# Patient Record
Sex: FEMALE | Race: WHITE | HISPANIC OR LATINO | Employment: OTHER | URBAN - METROPOLITAN AREA
[De-identification: names, ages, dates, MRNs, and addresses within clinical notes are randomized per-mention and may not be internally consistent; named-entity substitution may affect disease eponyms.]

---

## 2020-07-09 ENCOUNTER — TRANSFERRED RECORDS (OUTPATIENT)
Dept: HEALTH INFORMATION MANAGEMENT | Facility: CLINIC | Age: 75
End: 2020-07-09

## 2020-07-09 LAB
CHOLEST SERPL-MCNC: 190 MG/DL
CREAT SERPL-MCNC: 0.62 MG/DL (ref 0.55–1.02)
GFR SERPL CREATININE-BSD FRML MDRD: >60 ML/MIN
GLUCOSE SERPL-MCNC: 91 MG/DL (ref 74–106)
HBA1C MFR BLD: 6.2 % (ref 0–5.7)
HDLC SERPL-MCNC: 49 MG/DL
LDLC SERPL CALC-MCNC: 108 MG/DL
POTASSIUM SERPL-SCNC: 4 MMOL/L (ref 3.5–5.1)
TRIGL SERPL-MCNC: 165 MG/DL

## 2020-08-17 ENCOUNTER — HOSPITAL ENCOUNTER (OUTPATIENT)
Dept: OCCUPATIONAL THERAPY | Facility: CLINIC | Age: 75
Setting detail: THERAPIES SERIES
End: 2020-08-17
Attending: FAMILY MEDICINE
Payer: COMMERCIAL

## 2020-08-17 PROCEDURE — 97140 MANUAL THERAPY 1/> REGIONS: CPT | Mod: GO | Performed by: OCCUPATIONAL THERAPIST

## 2020-08-17 PROCEDURE — 97165 OT EVAL LOW COMPLEX 30 MIN: CPT | Mod: GO | Performed by: OCCUPATIONAL THERAPIST

## 2020-08-17 NOTE — PROGRESS NOTES
08/17/20 1500   Quick Adds   Quick Adds Certification   Rehab Discipline   Discipline OT   Type of Visit   Type of visit Initial Edema Evaluation       present No   General Information   Start of care 08/17/20   Referring physician Robbin Monterroso MD   Orders Evaluate and treat as indicated   Order date 07/30/20   Medical diagnosis H/o HTN, OA, obesity.  Pt presents with BLE edema.   Onset of illness / date of surgery 08/01/15   Edema onset 08/01/15   Affected body parts LLE;RLE   Edema etiology comments contributing factors include:  sedentary lifestyle, abdominal pannus resting on inguinal lymphnodes   Pertinent history of current problem (PT: include personal factors and/or comorbidities that impact the POC; OT: include additional occupational profile info) Pt reported receiving CDT treatment in 2015, then being fit for custom stockings.  Pt has not received a new pair since 2015 and they have holes in them.     Surgical / medical history reviewed Yes   Prior level of functional mobility IND but slow   Prior treatment Complete decongestive therapy;Compression garments  (custom knee high stockings in 2015)   Community support   (pt lives alone, adult dtr with CP at her own place)   Patient role / employment history Employed   Living environment Apartment / condo   Living environment comments shoes can be tight at times   General observations wears shoes almost all the time   System Outcome Measures   Outcome Measures Lymphedema   Lymphedema Life Impact Scale (score range 0-72). A higher score indicates greater impairment. 30   Subjective Report   Patient report of symptoms leg swelling   Patient / Family Goals   Patient / family goals statement to reduce swelling in legs, get new stockings   Pain   Patient currently in pain No   Cognitive Status   Orientation Orientation to person, place and time   Level of consciousness Alert   Follows commands and answers questions 100% of the time    Edema Exam / Assessment   Skin condition comments BLes with skin intact, pink from ankles to midshins, and dry on anterior surfaces of lower shins, thick and long toenails.  BLEs with 1+ pitting of feet, 3+ pitting from ankles to midshins, 1+ pitting from midshins to knees, trace edema of knees and thighs.  Bilateral ankles are thicker due to edematous tissue.   Scar No   Ulceration No   Girth Measurements   Girth Measurements Refer to separate girth measurement flowsheet   Strength   Strength comments generalized weakness, winded with walk from car to clinic   Posture   Posture Normal   Activities of Daily Living   Activities of Daily Living IND   Bed Mobility   Bed mobility IND   Transfers   Transfers IND   Gait / Locomotion   Gait / Locomotion IND   Planned Edema Interventions   Planned edema interventions Manual lymph drainage;Gradient compression bandaging;Fit for compression garment;Exercises;Precautions to prevent infection / exacerbation;Education;Manual therapy;Skin care / precautions;Home management program development   Clinical Impression   Criteria for skilled therapeutic intervention met Yes   Therapy diagnosis BLE lymphedema   Influenced by the following impairments / conditions Stage 2   Assessment of Occupational Performance 1-3 Performance Deficits   Identified Performance Deficits at risk for progression if  not treated, at risk for infection,    Clinical Decision Making (Complexity) Low complexity   Treatment Frequency   (4x/wk x3wks, 1x/wk x3wks)   Treatment duration within 12 weeks due to scheduling conflicts   Patient / family and/or staff in agreement with plan of care Yes   Risks and benefits of therapy have been explained Yes   Clinical impression comments Pt presents with stage ll lymphedema of BLEs.  Pt has old compression stockings from 2015 but they have holes in them.  Pt reported wear of these old stockings until about 4 weeks ago and now leg edema has increased.  Pt will benefit  from continued skilled OT intervention to address BLE edema to reduce size for prevention of infection, to preserve skin integrity and to be fit for new compression garments for edema management at discharge.    Goals   Edema Eval Goals 1;2;3   Goal 1   Goal identifier home program   Goal description Pt will report compliance with home program at two consecutive treatment sessions including: GCB to BLes for 23 hours each day , self MLD and walking of at least 15 minutes each day to reduce edema to improve skin integrity, prevent infection and to be fit for compression garments for edema management at discharge.   Target date 11/09/20   Goal 2   Goal identifier volume   Goal description Pt will demonstrate at least a 500mL reduction in each RLE and LLE volume to prevent infection and to be fit for compression garments for edema management at discharge.   Target date 11/09/20   Goal 3   Goal identifier discharge   Goal description Pt will be IND with donning compression garment(s) and verbalizing wear/wash/replace schedule for edema management at discharge.    Target date 11/09/20   Total Evaluation Time   OT Eval, Low Complexity Minutes (67623) 30   Certification   Certification date from 08/17/20   Certification date to 11/09/20   Medical Diagnosis BLE stage ll lymphedema

## 2020-08-17 NOTE — PROGRESS NOTES
[facility]        OUTPATIENT OCCUPATIONAL THERAPY EDEMA EVALUATION  PLAN OF TREATMENT FOR OUTPATIENT REHABILITATION  (COMPLETE FOR INITIAL CLAIMS ONLY)  Patient's Last Name, First Name, Dora Christie s Name:   [facility] Medical Record No.  [id number]     Start of Care Date:  08/17/20   Onset Date:  08/01/15   Type:  OT   Medical Diagnosis:  BLE stage ll lymphedema   Therapy Diagnosis:  BLE lymphedema Visits from SOC:  1                                     __________________________________________________________________________________   Plan of Treatment/Functional Goals:    Manual lymph drainage, Gradient compression bandaging, Fit for compression garment, Exercises, Precautions to prevent infection / exacerbation, Education, Manual therapy, Skin care / precautions, Home management program development        GOALS  1. Goal description: Pt will report compliance with home program at two consecutive treatment sessions including: GCB to BLes for 23 hours each day , self MLD and walking of at least 15 minutes each day to reduce edema to improve skin integrity, prevent infection and to be fit for compression garments for edema management at discharge.       Target date: 11/09/20  2. Goal description: Pt will demonstrate at least a 500mL reduction in each RLE and LLE volume to prevent infection and to be fit for compression garments for edema management at discharge.       Target date: 11/09/20  3. Goal description: Pt will be IND with donning compression garment(s) and verbalizing wear/wash/replace schedule for edema management at discharge.        Target date: 11/09/20  4.            5.            6.               7.             8.              Treatment Frequency: (4x/wk x3wks, 1x/wk x3wks)   Treatment duration:  within 12 weeks due to scheduling conflicts    Liyah Solares OT                                    I CERTIFY THE NEED FOR THESE SERVICES FURNISHED UNDER        THIS PLAN OF TREATMENT AND WHILE UNDER MY CARE .             Physician Signature               Date    X_____________________________________________________                        Certification date from: 08/17/20       Certification date to: 11/09/20           Referring physician: Robbin Monterroso MD   Initial Assessment  See Epic Evaluation- Start of care: 08/17/20

## 2020-08-17 NOTE — IP AVS SNAPSHOT
MRN:[id number]                      After Visit Summary   8/17/2020    Dora Hoang    MRN: [id number]           Visit Information        Provider Department      8/17/2020  2:30 PM Sujatha, Liyah Mccormack, OT [facility]        Your next 10 appointments already scheduled    Sep 15, 2020 12:00 PM CDT  Lymphedema Treatment with Liyah Ksenia Thole, OT  [facility] ([facility] [address] [facility] [address]  [phone]      Sep 16, 2020  1:00 PM CDT  Lymphedema Treatment with Liyah Ksenia Thole, OT  [facility] ([facility] [address] [facility] [address]  [phone]      Sep 17, 2020  2:30 PM CDT  Lymphedema Treatment with Liyah Ksenia Thole, OT  [facility] ([facility] [address] [facility] [address]  [phone]      Sep 18, 2020  9:00 AM CDT  Lymphedema Treatment with Liyah Ksenia Thole, OT  [facility] ([facility] [address] [facility] [address]  [phone]         Care EveryWhere ID    This is your Care EveryWhere ID. This could be used by other organizations to access your [facility] medical records  [id number]       Equal Access to Services    FAUSTINO CHARLES AH: Hadii shan ku hadvaleriao Soadamali, waaxda luqadaha, qaybta kaalmada adeegyada, kush nunez. So [facility] [phone].    ATENCIÓN: Si habla español, tiene a roberto disposición servicios gratuitos de asistencia lingüística. Haroon al [phone].    We comply with applicable federal and state civil rights laws, including the [place] Human Rights Act. We do not discriminate on the basis of race, color, creed, Jewish, national origin, marital status, age, disability, sex, sexual orientation, or gender identity.

## 2020-09-15 ENCOUNTER — HOSPITAL ENCOUNTER (OUTPATIENT)
Dept: OCCUPATIONAL THERAPY | Facility: CLINIC | Age: 75
Setting detail: THERAPIES SERIES
End: 2020-09-15
Attending: FAMILY MEDICINE
Payer: COMMERCIAL

## 2020-09-15 PROCEDURE — 97140 MANUAL THERAPY 1/> REGIONS: CPT | Mod: GO | Performed by: OCCUPATIONAL THERAPIST

## 2020-09-16 ENCOUNTER — HOSPITAL ENCOUNTER (OUTPATIENT)
Dept: OCCUPATIONAL THERAPY | Facility: CLINIC | Age: 75
Setting detail: THERAPIES SERIES
End: 2020-09-16
Attending: FAMILY MEDICINE
Payer: COMMERCIAL

## 2020-09-16 PROCEDURE — 97140 MANUAL THERAPY 1/> REGIONS: CPT | Mod: GO | Performed by: OCCUPATIONAL THERAPIST

## 2020-09-17 ENCOUNTER — HOSPITAL ENCOUNTER (OUTPATIENT)
Dept: OCCUPATIONAL THERAPY | Facility: CLINIC | Age: 75
Setting detail: THERAPIES SERIES
End: 2020-09-17
Attending: FAMILY MEDICINE
Payer: COMMERCIAL

## 2020-09-17 PROCEDURE — 97140 MANUAL THERAPY 1/> REGIONS: CPT | Mod: GO | Performed by: OCCUPATIONAL THERAPIST

## 2020-09-18 ENCOUNTER — HOSPITAL ENCOUNTER (OUTPATIENT)
Dept: OCCUPATIONAL THERAPY | Facility: CLINIC | Age: 75
Setting detail: THERAPIES SERIES
End: 2020-09-18
Attending: FAMILY MEDICINE
Payer: COMMERCIAL

## 2020-09-18 PROCEDURE — 97140 MANUAL THERAPY 1/> REGIONS: CPT | Mod: GO | Performed by: OCCUPATIONAL THERAPIST

## 2020-09-23 ENCOUNTER — HOSPITAL ENCOUNTER (OUTPATIENT)
Dept: OCCUPATIONAL THERAPY | Facility: CLINIC | Age: 75
Setting detail: THERAPIES SERIES
End: 2020-09-23
Attending: FAMILY MEDICINE
Payer: COMMERCIAL

## 2020-09-23 PROCEDURE — 97140 MANUAL THERAPY 1/> REGIONS: CPT | Mod: GO | Performed by: OCCUPATIONAL THERAPIST

## 2020-09-24 ENCOUNTER — HOSPITAL ENCOUNTER (OUTPATIENT)
Dept: OCCUPATIONAL THERAPY | Facility: CLINIC | Age: 75
Setting detail: THERAPIES SERIES
End: 2020-09-24
Attending: FAMILY MEDICINE
Payer: COMMERCIAL

## 2020-09-24 PROCEDURE — 97140 MANUAL THERAPY 1/> REGIONS: CPT | Mod: GO | Performed by: OCCUPATIONAL THERAPIST

## 2020-09-28 ENCOUNTER — HOSPITAL ENCOUNTER (OUTPATIENT)
Dept: OCCUPATIONAL THERAPY | Facility: CLINIC | Age: 75
Setting detail: THERAPIES SERIES
End: 2020-09-28
Attending: FAMILY MEDICINE
Payer: COMMERCIAL

## 2020-09-28 PROCEDURE — 97140 MANUAL THERAPY 1/> REGIONS: CPT | Mod: GO | Performed by: OCCUPATIONAL THERAPIST

## 2020-09-29 ENCOUNTER — HOSPITAL ENCOUNTER (OUTPATIENT)
Dept: OCCUPATIONAL THERAPY | Facility: CLINIC | Age: 75
Setting detail: THERAPIES SERIES
End: 2020-09-29
Attending: FAMILY MEDICINE
Payer: COMMERCIAL

## 2020-09-29 PROCEDURE — 97140 MANUAL THERAPY 1/> REGIONS: CPT | Mod: GO | Performed by: OCCUPATIONAL THERAPIST

## 2020-09-30 ENCOUNTER — HOSPITAL ENCOUNTER (OUTPATIENT)
Dept: OCCUPATIONAL THERAPY | Facility: CLINIC | Age: 75
Setting detail: THERAPIES SERIES
End: 2020-09-30
Attending: FAMILY MEDICINE
Payer: COMMERCIAL

## 2020-09-30 PROCEDURE — 97140 MANUAL THERAPY 1/> REGIONS: CPT | Mod: GO | Performed by: OCCUPATIONAL THERAPIST

## 2020-09-30 NOTE — PROGRESS NOTES
Outpatient Occupational Therapy Progress Note     Patient: Dora Hoang  : 1945    Beginning/End Dates of Reporting Period:  2020 to 2020    Referring Provider: Kelly Rodriguez MD    Therapy Diagnosis: BLE lymphedema    Client Self Report:   0    Objective Measurements:     Objective Measure: skin assessment   Details: 2020: trace edema of bilateral feet, mild congestion at ankles with many wrinkles, minimal edema from above ankle to knees.  Bilateral ankles and shins with pink coloring and dry and flaking skin, many wrinkles at ankles.   Objective Measure: volume   Details: NT   Objective Measure: LLIS   Details: NT      Goals:   Goal Identifier home program   Goal Description Pt will report compliance with home program at two consecutive treatment sessions including: GCB to BLes for 23 hours each day , self MLD and walking of at least 15 minutes each day to reduce edema to improve skin integrity, prevent infection and to be fit for compression garments for edema management at discharge.   Target Date 20   Date Met      Progress:     Goal Identifier volume   Goal Description Pt will demonstrate at least a 500mL reduction in each RLE and LLE volume to prevent infection and to be fit for compression garments for edema management at discharge.   Target Date 20   Date Met  20   Progress:     Goal Identifier discharge   Goal Description Pt will be IND with donning compression garment(s) and verbalizing wear/wash/replace schedule for edema management at discharge.    Target Date 20   Date Met      Progress:     Goal Identifier     Goal Description     Target Date     Date Met      Progress:     Goal Identifier     Goal Description     Target Date     Date Met      Progress:     Goal Identifier     Goal Description     Target Date     Date Met      Progress:     Goal Identifier     Goal Description     Target Date     Date Met      Progress:     Goal Identifier      Goal Description     Target Date     Date Met      Progress:     Progress Toward Goals:   Pt is making progress.  Edema reductions in BLEs from 8/17/2020 to 9/23/2020: RLE -1306mL and LLE -1370mL.  Most challenging to pt is reapply GCB over the weekends when she is unable to come into the clinic.  Pt cannot reapply GCB herself due to body habitus and inability to reach her feet.  Pt lives alone and has no assistance at home.       Plan:  Continue therapy per current plan of care.    Discharge:  No

## 2020-10-01 ENCOUNTER — HOSPITAL ENCOUNTER (OUTPATIENT)
Dept: OCCUPATIONAL THERAPY | Facility: CLINIC | Age: 75
Setting detail: THERAPIES SERIES
End: 2020-10-01
Attending: FAMILY MEDICINE
Payer: COMMERCIAL

## 2020-10-01 PROCEDURE — 97140 MANUAL THERAPY 1/> REGIONS: CPT | Mod: GO | Performed by: OCCUPATIONAL THERAPIST

## 2020-10-06 ENCOUNTER — HOSPITAL ENCOUNTER (OUTPATIENT)
Dept: OCCUPATIONAL THERAPY | Facility: CLINIC | Age: 75
Setting detail: THERAPIES SERIES
End: 2020-10-06
Attending: FAMILY MEDICINE
Payer: COMMERCIAL

## 2020-10-06 PROCEDURE — 97140 MANUAL THERAPY 1/> REGIONS: CPT | Mod: GO | Performed by: OCCUPATIONAL THERAPIST

## 2020-10-14 ENCOUNTER — HOSPITAL ENCOUNTER (OUTPATIENT)
Dept: OCCUPATIONAL THERAPY | Facility: CLINIC | Age: 75
Setting detail: THERAPIES SERIES
End: 2020-10-14
Attending: FAMILY MEDICINE
Payer: COMMERCIAL

## 2020-10-14 PROCEDURE — 97140 MANUAL THERAPY 1/> REGIONS: CPT | Mod: GO | Performed by: OCCUPATIONAL THERAPIST

## 2021-06-22 ENCOUNTER — TRANSFERRED RECORDS (OUTPATIENT)
Dept: HEALTH INFORMATION MANAGEMENT | Facility: CLINIC | Age: 76
End: 2021-06-22

## 2021-07-07 NOTE — PROGRESS NOTES
Outpatient Occupational Therapy Discharge Note     Patient: Dora Hoang  : 1945    Beginning/End Dates of Reporting Period:  2020  to 10/14/2020    Referring Provider: Kelly Monterroso MD    Therapy Diagnosis: BLE stage ll lymphedema of BLEs    Client Self Report: ReadyWrap leg pieces arrived 10/13/2020     Objective Measurements:     Objective Measure: skin assessment   Details: 10/14/2020: RLE with pink from ankle to midshin, flaking and dry skin on lower leg.  No edema of foot, 2+ pitting from ankle to knee crease, trace edema knee to groin. LLE with very light pink coloring from ankle to knee, no edema of foot, 1+ pitting at ankle to knee crease, visible wrinkles at ankle, trace edema from knee to groin.    Objective Measure: volume   Details: NT   Objective Measure: LLIS   Details: 20-a 10 point reduction                              Outcome Measures (most recent score):  Lymphedema Life Impact Scale (score range 0-72). A higher score indicates greater impairment.: 20(a 10 point reduction)    Goals:   Goal Identifier home program   Goal Description Pt will report compliance with home program at two consecutive treatment sessions including: GCB to BLes for 23 hours each day , self MLD and walking of at least 15 minutes each day to reduce edema to improve skin integrity, prevent infection and to be fit for compression garments for edema management at discharge.   Target Date 20   Date Met      Progress:     Goal Identifier volume   Goal Description Pt will demonstrate at least a 500mL reduction in each RLE and LLE volume to prevent infection and to be fit for compression garments for edema management at discharge.   Target Date 20   Date Met  20   Progress:     Goal Identifier discharge   Goal Description Pt will be IND with donning compression garment(s) and verbalizing wear/wash/replace schedule for edema management at discharge.    Target Date 20   Date Met       Progress:         Progress Toward Goals:   Pt seen for 13 visits, met 1/3 goals.    Skin integrity of BLEs improved with treatment.     Plan:  Discharge from therapy.    Discharge:    Reason for Discharge: Patient has failed to schedule further appointments.    Equipment Issued: [facility] materials    Discharge Plan: Patient to continue home program: daily wear of ReadyWrap leg pieces.  OK to wear at night but make sure to remove and apply each day.

## 2021-07-08 ENCOUNTER — MEDICAL CORRESPONDENCE (OUTPATIENT)
Dept: HEALTH INFORMATION MANAGEMENT | Facility: CLINIC | Age: 76
End: 2021-07-08

## 2021-07-10 ENCOUNTER — HEALTH MAINTENANCE LETTER (OUTPATIENT)
Age: 76
End: 2021-07-10

## 2021-08-02 ENCOUNTER — HOSPITAL ENCOUNTER (OUTPATIENT)
Dept: OCCUPATIONAL THERAPY | Facility: CLINIC | Age: 76
Setting detail: THERAPIES SERIES
End: 2021-08-02
Attending: FAMILY MEDICINE
Payer: MEDICARE

## 2021-08-02 PROCEDURE — 97166 OT EVAL MOD COMPLEX 45 MIN: CPT | Mod: GO | Performed by: OCCUPATIONAL THERAPIST

## 2021-08-02 PROCEDURE — 97535 SELF CARE MNGMENT TRAINING: CPT | Mod: GO | Performed by: OCCUPATIONAL THERAPIST

## 2021-08-03 NOTE — PROGRESS NOTES
Rehabilitation Services        OUTPATIENT OCCUPATIONAL THERAPY EDEMA EVALUATION  PLAN OF TREATMENT FOR OUTPATIENT REHABILITATION  (COMPLETE FOR INITIAL CLAIMS ONLY)  Patient's Last Name, First Name, Dora Christie                           Provider s Name:   Liyah Solares, OT Medical Record No.  [id number]     Start of Care Date:  08/02/21   Onset Date:  08/01/15   Type:  OT   Medical Diagnosis:  BLE stage ll/lll lymphedema of BLEs   Therapy Diagnosis:  stage ll/lll lymphedema of BLEs Visits from SOC:  1                                     __________________________________________________________________________________   Plan of Treatment/Functional Goals:    Manual lymph drainage, Gradient compression bandaging, Fit for compression garment, Exercises, Precautions to prevent infection / exacerbation, Education, Skin care / precautions, Home management program development        GOALS  1. Goal description: Pt will report compliance with home program 7/7 days including GCB to BLEs, self MLD and HEP to reduce edema to improve skin integrity, prevent infection and to be fit for compression garments for edema management at discharge.       Target date: 09/27/21  2. Goal description: Pt will demonstrate at least a 2400mL reduction in RLE and a 1230mL reduction in LLE volume to be closer in size from measurements taken in October of 2020, to improve skin integrity and to assist with fit for compression garment for edema management at discharge.        Target date: 09/27/21  3. Goal description: Pt will be IND with donning compression garment(s) and verbalizing wear/wash/replace schedule for edema management at discharge.        Target date: 09/27/21  4. Goal description: Due to edema reduction in BLEs, pt will be able to walk from entrance of building to Edema Clinic on the lower level.         Target date: 09/27/21  5.            6.               7.             8.              Treatment Frequency:  (4x/wk x3wks, 1x/wk x3wks)   Treatment duration: within 8 weeks    Liyah Solares OT                                    I CERTIFY THE NEED FOR THESE SERVICES FURNISHED UNDER        THIS PLAN OF TREATMENT AND WHILE UNDER MY CARE .             Physician Signature               Date    X_____________________________________________________                        Certification date from: 08/02/21       Certification date to: 09/27/21           Referring physician: Sola Pritchard PA-C   Initial Assessment  See Epic Evaluation- Start of care: 08/02/21

## 2021-08-03 NOTE — PROGRESS NOTES
08/02/21 1510   Quick Adds   Quick Adds Certification   Rehab Discipline   Discipline OT   Type of Visit   Type of visit Initial Edema Evaluation   General Information   Start of care 08/02/21   Referring physician Sola Pritchard PA-C   Orders Evaluate and treat as indicated   Order date 07/08/21   Medical diagnosis H/o obesity, HTN, OA.  Pt presents with BLE edema that has increased since dischange October 2020.   Onset of illness / date of surgery 08/01/15   Edema onset 08/01/15   Affected body parts LLE;RLE   Edema etiology comments contributing factors include:  sedentary lifestyle, abdominal pannus resting on inguinal lymphnodes   Pertinent history of current problem (PT: include personal factors and/or comorbidities that impact the POC; OT: include additional occupational profile info) Pt reported receiving CDT treatment in 2015, then being fit for custom stockings.  Pt seen for OP CDT by writer for 13 visits in 2020.  Pt fit for ReadyWrap leg pieces and now they are not fitting correctly and BLE edema has increased.     Surgical / medical history reviewed Yes   Prior level of functional mobility Arrived in  due to the long distance from car but able to walk from lobby to treatment room.    Prior treatment Complete decongestive therapy;Compression garments;MLD;Gradient compression bandaging  (custom knee high stockings in 2015, ReadyWrap in 2020)   Community support   (pt lives alone)   Patient role / employment history Employed   Living environment Apartment / condo   Living environment comments arrived in [employer] shoes as non-medical shoes parnell not fit   General observations Pt arrived with Readywraps on BLEs but they are falling down.  Pt reported daily wear of ReadyWraps.    Fall Risk Screen   Fall screen completed by OT   Have you fallen 2 or more times in the past year? No   Is patient a fall risk? No   Abuse Screen (yes response referral indicated)   Feels Unsafe at Home or Work/School no   Feels  Threatened by Someone no   Does Anyone Try to Keep You From Having Contact with Others or Doing Things Outside Your Home? no   Physical Signs of Abuse Present no   Subjective Report   Patient report of symptoms swelling in BLEs   Patient / Family Goals   Patient / family goals statement to reduce size of BLEs, get fit for custom stockings   Pain   Patient currently in pain No   Pain comments sensation of heaviness, occassional pain in right heel.    Cognitive Status   Orientation Orientation to person, place and time   Level of consciousness Alert   Follows commands and answers questions 100% of the time   Edema Exam / Assessment   Skin condition comments RLE with papillima on right, lower, anterior leg-pink in color, lobule on right, medial ankle, 3+ pitting at dorsal foot, moderate-severe, firm edema from ankle to knee crease, trace edema of knee and thigh.  LLE with 2+ pitting on foot, minimal edema at ankle with minimal-moderate edema from above ankle to knee crease (softer versus RLE), trace edema of knee and thigh.    Scar No   Ulceration No   Girth Measurements   Girth Measurements Refer to separate girth measurement flowsheet   Volume LE   % difference Comparisons from last measurements taken 10/14/2020: RLE +2474mL and LLE +1236mL   Strength   Strength comments generalized deconditioning   Posture   Posture Forward head position   Activities of Daily Living   Activities of Daily Living IND   Bed Mobility   Bed mobility IND   Transfers   Transfers IND   Gait / Locomotion   Gait / Locomotion IND   Planned Edema Interventions   Planned edema interventions Manual lymph drainage;Gradient compression bandaging;Fit for compression garment;Exercises;Precautions to prevent infection / exacerbation;Education;Skin care / precautions;Home management program development   Clinical Impression   Criteria for skilled therapeutic intervention met Yes   Therapy diagnosis stage ll/lll lymphedema of BLEs   Assessment of  Occupational Performance 3-5 Performance Deficits   Identified Performance Deficits ill fit of shoes, at risk for infection, at risk for progression of edema, difficulty walking    Clinical Decision Making (Complexity) Moderate complexity   Treatment Frequency   (4x/wk x3wks, 1x/wk x3wks)   Treatment duration within 8 weeks   Patient / family and/or staff in agreement with plan of care Yes   Risks and benefits of therapy have been explained Yes   Clinical impression comments Pt presents with stage ll/lll lymphedema of BLEs.  Pt reported difficulty with home program as ReadyWraps tend to fall down after they are donned.  Pt endorses weight gain and an increase in BLE edema since last visit in October of 2020.  Pt will benefit from continued skilled OT intervention to address BLE edema to assist in reduction, fit for compression garments and ability to ambulate further distances.     Goals   Edema Eval Goals 4   Goal 1   Goal identifier 1 home program   Goal description Pt will report compliance with home program 7/7 days including GCB to BLEs, self MLD and HEP to reduce edema to improve skin integrity, prevent infection and to be fit for compression garments for edema management at discharge.   Target date 09/27/21   Goal 2   Goal identifier 2 volume   Goal description Pt will demonstrate at least a 2400mL reduction in RLE and a 1230mL reduction in LLE volume to be closer in size from measurements taken in October of 2020, to improve skin integrity and to assist with fit for compression garment for edema management at discharge.    Target date 09/27/21   Goal 3   Goal identifier 3 garments   Goal description Pt will be IND with donning compression garment(s) and verbalizing wear/wash/replace schedule for edema management at discharge.    Target date 09/27/21   Goal 4   Goal identifier 4 walking   Goal description Due to edema reduction in BLEs, pt will be able to walk from entrance of building to Edema Clinic on the  lower level.    Target date 09/27/21   Total Evaluation Time   OT Eval, Moderate Complexity Minutes (41138) 15   Certification   Certification date from 08/02/21   Certification date to 09/27/21   Medical Diagnosis BLE stage ll/lll lymphedema of BLEs

## 2021-09-04 ENCOUNTER — HEALTH MAINTENANCE LETTER (OUTPATIENT)
Age: 76
End: 2021-09-04

## 2021-11-01 ENCOUNTER — HOSPITAL ENCOUNTER (OUTPATIENT)
Dept: OCCUPATIONAL THERAPY | Facility: CLINIC | Age: 76
Setting detail: THERAPIES SERIES
End: 2021-11-01
Attending: FAMILY MEDICINE
Payer: MEDICARE

## 2021-11-01 PROCEDURE — 97535 SELF CARE MNGMENT TRAINING: CPT | Mod: GO,59 | Performed by: OCCUPATIONAL THERAPIST

## 2021-11-01 PROCEDURE — 97140 MANUAL THERAPY 1/> REGIONS: CPT | Mod: GO | Performed by: OCCUPATIONAL THERAPIST

## 2021-11-01 PROCEDURE — 97168 OT RE-EVAL EST PLAN CARE: CPT | Mod: GO | Performed by: OCCUPATIONAL THERAPIST

## 2021-11-01 NOTE — PROGRESS NOTES
[facility]        OUTPATIENT OCCUPATIONAL THERAPY EDEMA EVALUATION  PLAN OF TREATMENT FOR OUTPATIENT REHABILITATION  (COMPLETE FOR INITIAL CLAIMS ONLY)  Patient's Last Name, First Name, Dora Christie                           Provider s Name:   [facility] Medical Record No.  [id number]     Start of Care Date:  11/01/21   Onset Date:  08/01/15   Type:  OT   Medical Diagnosis:  B LE Lymphedema   Therapy Diagnosis:  Stage II/III lymphedema of BLE Visits from SOC:  1                                     __________________________________________________________________________________   Plan of Treatment/Functional Goals:    Manual lymph drainage, Gradient compression bandaging, Fit for compression garment, Exercises, Precautions to prevent infection / exacerbation, Manual therapy, Home management program development        GOALS  1. Goal description: patient will be indep with home program and understand risk factors and infection prevention principles to prevent wounds or infection       Target date: 01/30/22  2. Goal description: patient will reduce bilateral legs by at least 20 percent of current volume       Target date: 01/30/22  3. Goal description: patient will demo understanding of appropriate compression garments and wearing schedule , how to don indep and how to obtain indep       Target date: 01/30/22  4. Goal description: patient will demo undstanding of how to don GCB at least quick wrap and be able to  complete her self with modified techniiques and tolerate wearing GCB for 23 out of 24 hours a day.       Target date: 01/30/22      Treatment Frequency: 5x/week   Treatment duration: 5x2, 2x2, 0x3, 1x1    MAAME KONG, OT                                    I CERTIFY THE NEED FOR THESE SERVICES FURNISHED UNDER        THIS PLAN OF TREATMENT  AND WHILE UNDER MY CARE     (Physician co-signature of this document indicates review and certification of the therapy plan).                   Certification date from: 11/01/21       Certification date to: 01/30/22           Referring physician: Sola Pritchard MD   [facility] [phone]  Fax [phone]   Initial Assessment  See Epic Evaluation- Start of care: 11/01/21

## 2021-11-01 NOTE — PROGRESS NOTES
11/01/21 1000   Quick Adds   Quick Adds Certification   Rehab Discipline   Discipline OT   Type of Visit   Type of visit Edema Re-evaluation   General Information   Start of care 11/01/21   Referring physician Sola Pritchard MD    Orders Evaluate and treat as indicated   Order date 07/08/21   Medical diagnosis B LE Lymphedema   Onset of illness / date of surgery 08/01/15   Edema onset 08/01/15   Affected body parts LLE;RLE;Trunk   Edema etiology comments sedentary, obese,    Pertinent history of current problem (PT: include personal factors and/or comorbidities that impact the POC; OT: include additional occupational profile info) patient was seen in this clinic in 2015 by Mariah Valles and then in 202 was seen for 13 visits and was fit for ready wraps for long term managment of her lymphedema previously she had used custom compression socks but they were expensive , however they did work the best for her.  she had a flair up in 2021 and weight gain in 2021 and was able to see Liyah SARMIENTO in [facility] for an evaluation but she was transition to another clinic in [facility] so she was not able to be seen for follow up visits.  Denia went to [facility] for lymph tx during the intermin and they used coban on her legs and had some success but wasn't able to keep up with it and now here legs are larger than they were in august when she saw Liyah in [facility].  she struggles with being able to reach her feet and has poor body image to the point that she can't stand looking at her feet today during assessment    Surgical / medical history reviewed Yes   Prior level of functional mobility indep with sudhir today but she is extremly short of breath and it takes alot of efforr for her to walk from the lobby to the tx room about 30 feet away.   Prior treatment Complete decongestive therapy;Compression garments;MLD;Gradient compression bandaging   Patient role / employment history Employed   Psychosocial concerns Impaired  body image   Living environment Apartment / condo   Living environment comments lives alone no support network available.    Current assistive devices Standard cane   General observations patient arrives with only 1 ready wrap on her larger leg and she reports she couldn't fit it on her left leg which used to be smaller but is now too larger for the wrap, however  visual observation that her left leg is significantly smaller than her right leg she is wearing wraps on and at end of tx she is able to easily don the comprssion wrap onto the left side, patient really avoids looking at her legs and avoids the issue due to difficulty coping,she has [employer] surgical shoes on and she reports they are not comfortabel and she is so anxious about losing it off her foot while driving her right ankle she tightens so tight that is is leaving deep pink area on ankle,    Abuse Screen (yes response referral indicated)   Feels Unsafe at Home or Work/School no   Feels Threatened by Someone no   Does Anyone Try to Keep You From Having Contact with Others or Doing Things Outside Your Home? no   Physical Signs of Abuse Present no   System Outcome Measures   Outcome Measures Lymphedema   Subjective Report   Patient report of symptoms pain, aching, heaviness, difficulty cleaning    Patient / Family Goals   Patient / family goals statement get swelling down on legs    Pain   Patient currently in pain Yes   Pain location lower legs   Pain rating 3/10   Pain description Heaviness;Ache;Discomfort   Cognitive Status   Orientation Orientation to person, place and time   Level of consciousness Alert   Follows commands and answers questions 100% of the time   Personal safety and judgement Intact   Memory Intact   Cognitive status comments demo decreased problem solving skills    Edema Exam / Assessment   Skin condition Pitting;Dryness;Lipodermatosclerosis   Skin condition comments no open areas but signs of previous Venous stasis ulcers. right LE is  more severer and ankle exremely large, toes ahve fluid trapped in them and they are painful to the patient and it is clear that she is not able to get into clean them very easily, moderate edema from above ankle to knee and thigh bilaterally   Pitting 2+   Pitting location B LE toes below knee   Stemmer sign Positive   Ulceration comments granulated scab from VSU on right calf, no concerns    Posture   Posture Forward head position   Palpation   Palpation firm tissues in feet and lower legs    Activities of Daily Living   Activities of Daily Living indep   Bed Mobility   Bed mobility indep but difficult   Transfers   Transfers indep but difficult   Gait / Locomotion   Gait / Locomotion indep but difficult and uses peguero    Planned Edema Interventions   Planned edema interventions Manual lymph drainage;Gradient compression bandaging;Fit for compression garment;Exercises;Precautions to prevent infection / exacerbation;Manual therapy;Home management program development   Clinical Impression   Criteria for skilled therapeutic intervention met Yes   Therapy diagnosis Stage II/III lymphedema of BLE   Assessment of Occupational Performance 3-5 Performance Deficits   Identified Performance Deficits unable to wear regular shoes, at risk for infection, at risk for progression of edema, difficulty walking, transferring and lifting her legs for ADLS and IADLS    Clinical Decision Making (Complexity) Moderate complexity   Treatment Frequency 5x/week   Treatment duration 5x2, 2x2, 0x3, 1x1   Patient / family and/or staff in agreement with plan of care Yes   Risks and benefits of therapy have been explained Yes   Clinical impression comments patient is wishy washy and not clear on her past history , however is highly motivated to particpate and it sounds like in the past not interested in learning GCB however is willing to try now and it will be challenging due to her obesity but possible with modifications and quick wrap method.,     Goals   Edema Eval Goals 1;2;3;4   Goal 1   Goal identifier home program /education    Goal description patient will be indep with home program and understand risk factors and infection prevention principles to prevent wounds or infection   Target date 01/30/22   Goal 2   Goal identifier volume    Goal description patient will reduce bilateral legs by at least 20 percent of current volume   Target date 01/30/22   Goal 3   Goal identifier compression garments    Goal description patient will demo understanding of appropriate compression garments and wearing schedule , how to don indep and how to obtain indep   Target date 01/30/22   Goal 4   Goal identifier GCB   Goal description patient will demo undstanding of how to don GCB at least quick wrap and be able to  complete her self with modified techniiques and tolerate wearing GCB for 23 out of 24 hours a day.   Target date 01/30/22   Certification   Certification date from 11/01/21   Certification date to 01/30/22   Medical Diagnosis B LE Lymphedema   Certification I certify the need for these services furnished under this plan of treatment and while under my care.  (Physician co-signature of this document indicates review and certification of the therapy plan).

## 2022-01-27 NOTE — PROGRESS NOTES
[facility]        OUTPATIENT OCCUPATIONAL THERAPY EDEMA EVALUATION  PLAN OF TREATMENT FOR OUTPATIENT REHABILITATION  (COMPLETE FOR INITIAL CLAIMS ONLY)  Patient's Last Name, First Name, Dora Christie                           Provider s Name:   [facility] Medical Record No.  [id number]     Start of Care Date:  11/01/21   Onset Date:  08/01/15   Type:  OT   Medical Diagnosis:  B LE Lymphedema   Therapy Diagnosis:  Stage II/III lymphedema of BLE Visits from SOC:  1                                     __________________________________________________________________________________   Plan of Treatment/Functional Goals:    Manual lymph drainage,Gradient compression bandaging,Fit for compression garment,Exercises,Precautions to prevent infection / exacerbation,Manual therapy,Home management program development        GOALS  1. Goal description: patient will be indep with home program and understand risk factors and infection prevention principles to prevent wounds or infection       Target date: 01/30/22  2. Goal description: patient will reduce bilateral legs by at least 20 percent of current volume       Target date: 01/30/22  3. Goal description: patient will demo understanding of appropriate compression garments and wearing schedule , how to don indep and how to obtain indep       Target date: 01/30/22  4. Goal description: patient will demo undstanding of how to don GCB at least quick wrap and be able to  complete her self with modified techniiques and tolerate wearing GCB for 23 out of 24 hours a day.       Target date: 01/30/22      Treatment Frequency: 5x/week   Treatment duration: 5x2, 2x2, 0x3, 1x1    MAAME KONG, OT                                    I CERTIFY THE NEED FOR THESE SERVICES FURNISHED UNDER        THIS PLAN OF TREATMENT AND  WHILE UNDER MY CARE .             Physician Signature               Date    X_____________________________________________________                        Certification date from: 11/01/21       Certification date to: 01/30/22           Referring physician: Sola Pritchard MD    Initial Assessment  See Epic Evaluation- Start of care: 11/01/21

## 2022-02-28 ENCOUNTER — HOSPITAL ENCOUNTER (OUTPATIENT)
Dept: OCCUPATIONAL THERAPY | Facility: CLINIC | Age: 77
Setting detail: THERAPIES SERIES
End: 2022-02-28
Attending: FAMILY MEDICINE
Payer: COMMERCIAL

## 2022-02-28 DIAGNOSIS — I89.0 LYMPHEDEMA OF BOTH LOWER EXTREMITIES: Primary | ICD-10-CM

## 2022-02-28 PROCEDURE — 97140 MANUAL THERAPY 1/> REGIONS: CPT | Mod: GO

## 2022-02-28 NOTE — PROGRESS NOTES
[facility]    OUTPATIENT OCCUPATIONAL THERAPY  PLAN OF TREATMENT FOR OUTPATIENT REHABILITATION AND PROGRESS NOTE    Patient's Last Name, First Name, Dora Christie Date of Birth  1945   Provider's Name  [facility] Medical Record No.  [id number]    Onset Date  7/2/2021 Start of Care Date  11/1/2021   Type:     __PT   _X_OT   __SLP Medical Diagnosis  Lymphedema BLE   OT Diagnosis  Stage II/III lymphedema of BLE Plan of Treatment  Frequency/Duration: 5x/week/2  Certification date from 1/30/2022 to 4/29/2022     Goals:  Goal Identifier home program /education    Goal Description patient will be indep with home program and understand risk factors and infection prevention principles to prevent wounds or infection   Target Date 04/28/22   Date Met      Progress (detail required for progress note):       Goal Identifier volume    Goal Description patient will reduce bilateral legs by at least 20 percent of current volume   Target Date 04/28/22   Date Met      Progress (detail required for progress note):       Goal Identifier compression garments    Goal Description patient will demo understanding of appropriate compression garments and wearing schedule , how to don indep and how to obtain indep   Target Date 04/28/22   Date Met      Progress (detail required for progress note):       Goal Identifier GCB   Goal Description patient will demo undstanding of how to don GCB at least quick wrap and be able to  complete her self with modified techniiques and tolerate wearing GCB for 23 out of 24 hours a day.   Target Date 04/28/22   Date Met      Progress (detail required for progress note):             Beginning/End Dates of Progress Note Reporting Period:  11/1/21- 1/30/2022    Progress Toward Goals:   Patient evaluated 11/1/2021, now presents for initiation of  "Complete Decongestive Therapy.  Patient missed all appts. scheduled last week due to tranportation issues, has not been wearing any compression.  States lymphedema is dramatically worse than when presenting at evaluation.    Client Self (Subjective) Report for Progress Note Reporting Period: \"my leg is so bad, I can hardly get into the car\"    Outcome Measures (Most Recent Score):    Lymphedema Life Impact Scale (score range 0-72). A higher score indicates greater impairment.: 56    Objective Measurements:      Objective Measure: BBK volume   Details: see attached flowsheet:  RBK volume = 9055 mL, LBK volume = 6527 mL                                                 I CERTIFY THE NEED FOR THESE SERVICES FURNISHED UNDER        THIS PLAN OF TREATMENT AND WHILE UNDER MY CARE .             Physician Signature               Date    X_____________________________________________________                      Referring Provider: Dr. Sola Armijo, OT      "

## 2022-03-01 ENCOUNTER — HOSPITAL ENCOUNTER (OUTPATIENT)
Dept: OCCUPATIONAL THERAPY | Facility: CLINIC | Age: 77
Setting detail: THERAPIES SERIES
End: 2022-03-01
Attending: FAMILY MEDICINE
Payer: COMMERCIAL

## 2022-03-01 DIAGNOSIS — I89.0 LYMPHEDEMA OF BOTH LOWER EXTREMITIES: Primary | ICD-10-CM

## 2022-03-01 PROCEDURE — 97140 MANUAL THERAPY 1/> REGIONS: CPT | Mod: GO

## 2022-03-02 ENCOUNTER — HOSPITAL ENCOUNTER (OUTPATIENT)
Dept: OCCUPATIONAL THERAPY | Facility: CLINIC | Age: 77
Setting detail: THERAPIES SERIES
End: 2022-03-02
Attending: FAMILY MEDICINE
Payer: COMMERCIAL

## 2022-03-02 PROCEDURE — 97140 MANUAL THERAPY 1/> REGIONS: CPT | Mod: GO

## 2022-03-03 ENCOUNTER — HOSPITAL ENCOUNTER (OUTPATIENT)
Dept: OCCUPATIONAL THERAPY | Facility: CLINIC | Age: 77
Setting detail: THERAPIES SERIES
End: 2022-03-03
Attending: FAMILY MEDICINE
Payer: COMMERCIAL

## 2022-03-03 DIAGNOSIS — I89.0 LYMPHEDEMA OF BOTH LOWER EXTREMITIES: Primary | ICD-10-CM

## 2022-03-03 PROCEDURE — 97140 MANUAL THERAPY 1/> REGIONS: CPT | Mod: GO

## 2022-03-04 ENCOUNTER — HOSPITAL ENCOUNTER (OUTPATIENT)
Dept: OCCUPATIONAL THERAPY | Facility: CLINIC | Age: 77
Setting detail: THERAPIES SERIES
End: 2022-03-04
Attending: FAMILY MEDICINE
Payer: COMMERCIAL

## 2022-03-04 DIAGNOSIS — I89.0 LYMPHEDEMA OF BOTH LOWER EXTREMITIES: Primary | ICD-10-CM

## 2022-03-04 PROCEDURE — 97140 MANUAL THERAPY 1/> REGIONS: CPT | Mod: GO

## 2022-04-19 ENCOUNTER — HOSPITAL ENCOUNTER (OUTPATIENT)
Dept: OCCUPATIONAL THERAPY | Facility: CLINIC | Age: 77
Discharge: HOME | End: 2022-04-19

## 2022-04-19 DIAGNOSIS — I89.0 LYMPHEDEMA OF BOTH LOWER EXTREMITIES: Primary | ICD-10-CM

## 2022-04-21 ENCOUNTER — HOSPITAL ENCOUNTER (OUTPATIENT)
Dept: OCCUPATIONAL THERAPY | Facility: CLINIC | Age: 77
Discharge: HOME | End: 2022-04-21
Payer: COMMERCIAL

## 2022-04-21 DIAGNOSIS — I89.0 LYMPHEDEMA OF BOTH LOWER EXTREMITIES: Primary | ICD-10-CM

## 2022-04-21 PROCEDURE — 97140 MANUAL THERAPY 1/> REGIONS: CPT | Mod: GO | Performed by: OCCUPATIONAL THERAPIST

## 2022-04-22 ENCOUNTER — HOSPITAL ENCOUNTER (OUTPATIENT)
Dept: OCCUPATIONAL THERAPY | Facility: CLINIC | Age: 77
Discharge: HOME | End: 2022-04-22
Payer: COMMERCIAL

## 2022-04-22 DIAGNOSIS — I89.0 LYMPHEDEMA OF BOTH LOWER EXTREMITIES: Primary | ICD-10-CM

## 2022-04-22 PROCEDURE — 97140 MANUAL THERAPY 1/> REGIONS: CPT | Mod: GO

## 2022-04-25 ENCOUNTER — HOSPITAL ENCOUNTER (OUTPATIENT)
Dept: OCCUPATIONAL THERAPY | Facility: CLINIC | Age: 77
Discharge: HOME | End: 2022-04-25
Payer: COMMERCIAL

## 2022-04-25 DIAGNOSIS — I89.0 LYMPHEDEMA OF BOTH LOWER EXTREMITIES: Primary | ICD-10-CM

## 2022-04-25 PROCEDURE — 97140 MANUAL THERAPY 1/> REGIONS: CPT | Mod: GO | Performed by: OCCUPATIONAL THERAPIST

## 2022-04-27 ENCOUNTER — HOSPITAL ENCOUNTER (OUTPATIENT)
Dept: OCCUPATIONAL THERAPY | Facility: CLINIC | Age: 77
Discharge: HOME | End: 2022-04-27
Payer: COMMERCIAL

## 2022-04-27 DIAGNOSIS — I89.0 LYMPHEDEMA OF BOTH LOWER EXTREMITIES: Primary | ICD-10-CM

## 2022-04-27 PROCEDURE — 97140 MANUAL THERAPY 1/> REGIONS: CPT | Mod: GO

## 2022-04-28 ENCOUNTER — HOSPITAL ENCOUNTER (OUTPATIENT)
Dept: OCCUPATIONAL THERAPY | Facility: CLINIC | Age: 77
Discharge: HOME | End: 2022-04-28
Payer: COMMERCIAL

## 2022-04-28 DIAGNOSIS — I89.0 LYMPHEDEMA OF BOTH LOWER EXTREMITIES: Primary | ICD-10-CM

## 2022-04-28 PROCEDURE — 97140 MANUAL THERAPY 1/> REGIONS: CPT | Mod: GO | Performed by: OCCUPATIONAL THERAPIST

## 2022-04-29 ENCOUNTER — HOSPITAL ENCOUNTER (OUTPATIENT)
Dept: OCCUPATIONAL THERAPY | Facility: CLINIC | Age: 77
Discharge: HOME | End: 2022-04-29
Payer: COMMERCIAL

## 2022-04-29 DIAGNOSIS — I89.0 LYMPHEDEMA OF BOTH LOWER EXTREMITIES: Primary | ICD-10-CM

## 2022-04-29 PROCEDURE — 97140 MANUAL THERAPY 1/> REGIONS: CPT | Mod: GO

## 2022-04-29 NOTE — ADDENDUM NOTE
Encounter addended by: Liana Armijo, OT on: 4/29/2022 3:24 PM   Actions taken: Charge Capture section accepted

## 2022-04-29 NOTE — PROGRESS NOTES
[facility]    OUTPATIENT OCCUPATIONAL THERAPY  PLAN OF TREATMENT FOR OUTPATIENT REHABILITATION AND PROGRESS NOTE    Patient's Last Name, First Name, Dora Christie Date of Birth  1945   Provider's Name  [facility] Medical Record No.  [id number]    Onset Date  7/2/2021 Start of Care Date  11/1/2021   Type:     __PT   _X_OT   __SLP Medical Diagnosis  Lymphedema BLE   OT Diagnosis  Lymphedema BLE Plan of Treatment  Frequency/Duration: 5x/week/1, then 1x/one month  Certification date from 4/29/2022 to 7/29/2022     Goals:  Goal Identifier home program /education    Goal Description patient will be indep with home program and understand risk factors and infection prevention principles to prevent wounds or infection   Target Date 06/28/22   Date Met      Progress (detail required for progress note):       Goal Identifier volume    Goal Description patient will reduce bilateral legs by at least 20 percent of current volume   Target Date 06/28/22   Date Met      Progress (detail required for progress note):       Goal Identifier compression garments    Goal Description patient will demo understanding of appropriate compression garments and wearing schedule , how to don indep and how to obtain indep   Target Date 06/28/22   Date Met      Progress (detail required for progress note):       Goal Identifier GCB   Goal Description patient will demo undstanding of how to don GCB at least quick wrap and be able to  complete her self with modified techniiques and tolerate wearing GCB for 23 out of 24 hours a day.   Target Date 06/28/22         Beginning/End Dates of Progress Note Reporting Period:  1/30/2022 to 4/29/2022    Progress Toward Goals:   Patient showing good reduction in BBK volume, tolerating GCB well.  Patient scheduled for one additional week of  "Complete Decongestive Therapy for continued volume reduction and establishment of home program.    Client Self (Subjective) Report for Progress Note Reporting Period: \"I'm late but I got my bandages off!  I am going to [employer] on Monday to get the velcro binders\"                  I CERTIFY THE NEED FOR THESE SERVICES FURNISHED UNDER        THIS PLAN OF TREATMENT AND WHILE UNDER MY CARE .             Physician Signature               Date    X_____________________________________________________                      Referring Provider: Dr. Sola Armijo, OT      "

## 2022-05-02 ENCOUNTER — HOSPITAL ENCOUNTER (OUTPATIENT)
Dept: OCCUPATIONAL THERAPY | Facility: CLINIC | Age: 77
Discharge: HOME | End: 2022-05-02
Payer: COMMERCIAL

## 2022-05-02 DIAGNOSIS — I89.0 LYMPHEDEMA OF BOTH LOWER EXTREMITIES: Primary | ICD-10-CM

## 2022-05-02 PROCEDURE — 97140 MANUAL THERAPY 1/> REGIONS: CPT | Mod: GO | Performed by: OCCUPATIONAL THERAPIST

## 2022-05-03 ENCOUNTER — HOSPITAL ENCOUNTER (OUTPATIENT)
Dept: OCCUPATIONAL THERAPY | Facility: CLINIC | Age: 77
Discharge: HOME | End: 2022-05-03
Payer: COMMERCIAL

## 2022-05-03 DIAGNOSIS — I89.0 LYMPHEDEMA OF BOTH LOWER EXTREMITIES: Primary | ICD-10-CM

## 2022-05-03 PROCEDURE — 97140 MANUAL THERAPY 1/> REGIONS: CPT | Mod: GO

## 2022-05-04 ENCOUNTER — HOSPITAL ENCOUNTER (OUTPATIENT)
Dept: OCCUPATIONAL THERAPY | Facility: CLINIC | Age: 77
Discharge: HOME | End: 2022-05-04
Payer: COMMERCIAL

## 2022-05-04 PROCEDURE — 97140 MANUAL THERAPY 1/> REGIONS: CPT | Mod: GO

## 2022-05-05 NOTE — ADDENDUM NOTE
Encounter addended by: Liana Armijo, OT on: 5/5/2022 9:57 AM   Actions taken: Flowsheet data copied forward, Flowsheet accepted

## 2022-05-06 ENCOUNTER — HOSPITAL ENCOUNTER (OUTPATIENT)
Dept: OCCUPATIONAL THERAPY | Facility: CLINIC | Age: 77
Discharge: HOME | End: 2022-05-06
Payer: COMMERCIAL

## 2022-05-06 DIAGNOSIS — I89.0 LYMPHEDEMA OF BOTH LOWER EXTREMITIES: Primary | ICD-10-CM

## 2022-05-06 PROCEDURE — 97110 THERAPEUTIC EXERCISES: CPT | Mod: GO

## 2022-05-06 PROCEDURE — 97140 MANUAL THERAPY 1/> REGIONS: CPT | Mod: GO

## 2022-06-07 ENCOUNTER — HOSPITAL ENCOUNTER (OUTPATIENT)
Dept: OCCUPATIONAL THERAPY | Facility: CLINIC | Age: 77
Discharge: HOME | End: 2022-06-07
Payer: COMMERCIAL

## 2022-06-07 DIAGNOSIS — I89.0 LYMPHEDEMA OF BOTH LOWER EXTREMITIES: Primary | ICD-10-CM

## 2022-06-07 PROCEDURE — 97140 MANUAL THERAPY 1/> REGIONS: CPT | Mod: GO

## 2022-06-08 ENCOUNTER — HOSPITAL ENCOUNTER (OUTPATIENT)
Dept: OCCUPATIONAL THERAPY | Facility: CLINIC | Age: 77
Discharge: HOME | End: 2022-06-08
Payer: COMMERCIAL

## 2022-06-08 DIAGNOSIS — I89.0 LYMPHEDEMA OF BOTH LOWER EXTREMITIES: Primary | ICD-10-CM

## 2022-06-08 PROCEDURE — 97140 MANUAL THERAPY 1/> REGIONS: CPT | Mod: GO | Performed by: OCCUPATIONAL THERAPIST

## 2022-06-09 ENCOUNTER — HOSPITAL ENCOUNTER (OUTPATIENT)
Dept: OCCUPATIONAL THERAPY | Facility: CLINIC | Age: 77
Discharge: HOME | End: 2022-06-09
Payer: COMMERCIAL

## 2022-06-09 DIAGNOSIS — I89.0 LYMPHEDEMA OF BOTH LOWER EXTREMITIES: Primary | ICD-10-CM

## 2022-06-09 PROCEDURE — 97140 MANUAL THERAPY 1/> REGIONS: CPT | Mod: GO

## 2022-06-10 ENCOUNTER — HOSPITAL ENCOUNTER (OUTPATIENT)
Dept: OCCUPATIONAL THERAPY | Facility: CLINIC | Age: 77
Discharge: HOME | End: 2022-06-10
Payer: COMMERCIAL

## 2022-06-10 DIAGNOSIS — I89.0 LYMPHEDEMA OF BOTH LOWER EXTREMITIES: Primary | ICD-10-CM

## 2022-06-10 PROCEDURE — 97140 MANUAL THERAPY 1/> REGIONS: CPT | Mod: GO | Performed by: OCCUPATIONAL THERAPIST

## 2022-06-10 PROCEDURE — 97535 SELF CARE MNGMENT TRAINING: CPT | Mod: GO | Performed by: OCCUPATIONAL THERAPIST

## 2022-06-13 ENCOUNTER — HOSPITAL ENCOUNTER (OUTPATIENT)
Dept: OCCUPATIONAL THERAPY | Facility: CLINIC | Age: 77
Discharge: HOME | End: 2022-06-13
Payer: COMMERCIAL

## 2022-06-13 DIAGNOSIS — I89.0 LYMPHEDEMA OF BOTH LOWER EXTREMITIES: Primary | ICD-10-CM

## 2022-06-13 PROCEDURE — 97140 MANUAL THERAPY 1/> REGIONS: CPT | Mod: GO

## 2022-06-15 ENCOUNTER — HOSPITAL ENCOUNTER (OUTPATIENT)
Dept: OCCUPATIONAL THERAPY | Facility: CLINIC | Age: 77
Discharge: HOME | End: 2022-06-15
Payer: COMMERCIAL

## 2022-06-15 DIAGNOSIS — I89.0 LYMPHEDEMA OF BOTH LOWER EXTREMITIES: Primary | ICD-10-CM

## 2022-06-15 PROCEDURE — 97140 MANUAL THERAPY 1/> REGIONS: CPT | Mod: GO

## 2022-08-06 ENCOUNTER — HEALTH MAINTENANCE LETTER (OUTPATIENT)
Age: 77
End: 2022-08-06

## 2022-10-18 NOTE — ADDENDUM NOTE
Encounter addended by: Liana Armijo, OT on: 10/18/2022 3:16 PM   Actions taken: Clinical Note Signed, Flowsheet accepted

## 2022-10-18 NOTE — PROGRESS NOTES
"   06/15/22 1230   Signing Clinician's Name / Credentials   Signing clinician's name / credentials Edson Armijo, OTR/L, CLT   Session Number   Session Number 22 [facility] [employer]   Progress/Recertification   Progress Note Completed 06/10/22   Recertification Due 07/29/22   Recertification Completed 04/29/22   Goal 1   Goal identifier home program /education    Goal description patient will be indep with home program and understand risk factors and infection prevention principles to prevent wounds or infection   Target date 08/05/22   Goal 2   Goal identifier volume    Goal description patient will reduce bilateral legs by at least 20 percent of current volume   Goal Progress For decreased risk of infection, skin breakdown/wounds & progressive soft-tissue fibrosis and improved fit of footwear, volume RLE will be reduced by at least 2000 mL   Target date 08/05/22   Goal 3   Goal identifier compression garments    Goal description patient will demo understanding of appropriate compression garments and wearing schedule , how to don indep and how to obtain indep   Target date 08/05/22   Goal 4   Goal identifier GCB   Goal description To reduce volume of lymphedema and risk of soft tissue fibrosis, pt will tolerate up to 23hr/day wear gradient compression bandaging (GCB) of RLE   Target date 08/05/22   Subjective Report   Subjective Report \"I met with the new doctor yesterday, I talked to him about my depression, and also got a referral to pulmonology and podiatry\"   Manual Therapy   Manual Therapy Minutes (97955) 45   Skilled Intervention RLE GCB   Patient Response patient has not yet ordered velcro-strap thigh piece, verb understanding re: ed provided; RBK less congested upon presentation   Treatment Detail GCB to RBK:  [place] 4\" stockinette, cotton felt padding,  15 cm rosidal foam from ankle to distal knee crease, transelast toe wraps (6 cm) and  6 cm x1 and 8 cm x1 short stretch bandage applied to foot/ankle in " flexed position, 12 cm x1 short stretch bandage spiral wrapped from ankle to distal knee crease with appropriate stretch and spacing to allow gradient compression, 2nd [id number] SSB herringbone wrapped prox ankle to knee crease for appropriate gradient compression.  GCB applied to R thigh:  6  stockinette followed by 15cm roll foam, then 12cmx 5m SSB spiral wrapped distal knee to limb root with skilled adaptation for knee flexion followed by 2 [id number] SSB herringbone wrapped prox knee to limb root.  Encouraged patient to order Velcro-strap thigh piece to have in place at end of intensive, ed provided re: potential re-fill of right thigh and loss of progress; donned LBK Velcro-strap binder and footpiece.  Orange foam added over stockinette medial R ankle.   Progress +progress to goals #2, 3, and 4   Plan   Homework order thigh/knee velcro-strap compression binder   Home program HEP, NLN LE sequence,  BBK velcro-strap binders ([employer]) with footpieces or transition socks 22-23 hours per day, elevation, low-salt diet   Plan for next session RLE GCB, MLD as time allows   Comments   Comments Patient has been seen for multiple episodes of care with Complete Decongestive Therapy through different providers over the years, BLE lymphedema now progressing to stage 3 with papillomas, lobules, hx of venous stasis ulcers; patient struggles to manage swelling at home, now experiencing swelling in trunk/abdomen.  Recent basic pump trial showed reduction in ankle and calf measurements, but increased measurements in thigh, hip, and abdomen: Hips: pre: 151.5, post: 153.9  Thigh: pre: 79.3, post-trial 80.1.  Recommend lymphatic pump with complete decongestive cycle and truncal component to avoid pushing fluid into trunk/abdomen.   Total Session Time   Timed Code Treatment Minutes 45   Total Treatment Time (sum of timed and untimed services) 45   AMBULATORY CLINICS ONLY-MEDICAL AND TREATMENT DIAGNOSIS   Medical diagnosis FELICITA CALDERÓN  Lymphedema   Therapy diagnosis Stage II/III lymphedema of BLE

## 2022-10-22 ENCOUNTER — HEALTH MAINTENANCE LETTER (OUTPATIENT)
Age: 77
End: 2022-10-22

## 2023-03-29 ENCOUNTER — OFFICE VISIT (OUTPATIENT)
Dept: OPHTHALMOLOGY | Facility: CLINIC | Age: 78
End: 2023-03-29
Attending: OPHTHALMOLOGY
Payer: COMMERCIAL

## 2023-03-29 DIAGNOSIS — H25.13 NUCLEAR SENILE CATARACT OF BOTH EYES: Primary | ICD-10-CM

## 2023-03-29 PROCEDURE — 76519 ECHO EXAM OF EYE: CPT | Performed by: OPHTHALMOLOGY

## 2023-03-29 PROCEDURE — G0463 HOSPITAL OUTPT CLINIC VISIT: HCPCS | Performed by: OPHTHALMOLOGY

## 2023-03-29 PROCEDURE — 99204 OFFICE O/P NEW MOD 45 MIN: CPT | Performed by: OPHTHALMOLOGY

## 2023-03-29 PROCEDURE — 92025 CPTRIZED CORNEAL TOPOGRAPHY: CPT | Performed by: OPHTHALMOLOGY

## 2023-03-29 RX ORDER — CHLORTHALIDONE 25 MG/1
TABLET ORAL
COMMUNITY
Start: 2023-03-23

## 2023-03-29 RX ORDER — ATORVASTATIN CALCIUM 40 MG/1
TABLET, FILM COATED ORAL
COMMUNITY
Start: 2023-03-23

## 2023-03-29 ASSESSMENT — SLIT LAMP EXAM - LIDS
COMMENTS: NORMAL
COMMENTS: NORMAL

## 2023-03-29 ASSESSMENT — EXTERNAL EXAM - RIGHT EYE: OD_EXAM: NORMAL

## 2023-03-29 ASSESSMENT — VISUAL ACUITY
OD_PH_SC: 20/50
METHOD: SNELLEN - LINEAR
OS_SC+: +2
METHOD_MR: DIAGNOSTIC
OD_SC: 20/80
OS_PH_SC+: -2
OD_SC+: -1
OD_PH_SC+: -1
OS_SC: 20/40
OS_PH_SC: 20/30

## 2023-03-29 ASSESSMENT — CONF VISUAL FIELD
OD_INFERIOR_TEMPORAL_RESTRICTION: 0
OS_SUPERIOR_TEMPORAL_RESTRICTION: 0
METHOD: COUNTING FINGERS
OS_NORMAL: 1
OD_SUPERIOR_TEMPORAL_RESTRICTION: 0
OS_INFERIOR_NASAL_RESTRICTION: 0
OD_INFERIOR_NASAL_RESTRICTION: 0
OD_NORMAL: 1
OS_INFERIOR_TEMPORAL_RESTRICTION: 0
OD_SUPERIOR_NASAL_RESTRICTION: 0
OS_SUPERIOR_NASAL_RESTRICTION: 0

## 2023-03-29 ASSESSMENT — REFRACTION_MANIFEST
OS_CYLINDER: SPHERE
OD_SPHERE: -2.75
OS_SPHERE: -0.75
OD_CYLINDER: +1.25
OD_AXIS: 160

## 2023-03-29 ASSESSMENT — TONOMETRY
OS_IOP_MMHG: 16
OD_IOP_MMHG: 17
IOP_METHOD: ICARE

## 2023-03-29 ASSESSMENT — EXTERNAL EXAM - LEFT EYE: OS_EXAM: NORMAL

## 2023-03-29 NOTE — NURSING NOTE
Chief Complaints and History of Present Illnesses   Patient presents with     Consult For     Cataract Surgery     Chief Complaint(s) and History of Present Illness(es)     Consult For            Laterality: both eyes    Context: night driving    Associated symptoms: glare, haloes, dryness, eye pain (intermittent associated with dryness) and itching    Treatments tried: artificial tears    Pain scale: 0/10    Comments: Cataract Surgery          Comments    For the past 4 years she has noticed decreased vision in each eye.  She sees halos at night.  She avoids night driving as a result.  She has been increasing her font on her computer to see at near.      She tells me that she has a lot of dry eye.  She uses them 2-5x a day as needed.    Ceci Ramachandran, COT 1:32 PM  March 29, 2023

## 2023-03-29 NOTE — PROGRESS NOTES
Chief complaint     Chief Complaints and History of Present Illnesses   Patient presents with     Consult For     Cataract Surgery     Referring Provider: Self     HPI    Dora BARRON Hoang 77 year old female who presents for consultation for cataract surgery. Patient reports increasing difficulty with vision, especially at night while driving. Also endorses dryness that requires topical lubrication.     Past ocular history   Prior eye surgery/laser/Trauma: -  CTL wearer:-  Glasses : -  Family Hx of eye disease:-    PMH     Past Medical History:   Diagnosis Date     Hypertension      Uncomplicated asthma        PSH   History reviewed. No pertinent surgical history.    Meds     Current Outpatient Medications   Medication     atenolol (TENORMIN) 50 MG tablet     atorvastatin (LIPITOR) 40 MG tablet     chlorthalidone (HYGROTON) 25 MG tablet     No current facility-administered medications for this visit.       Labs   -    Imaging   -    Drops Currently Taking   -refresh    Assessment/Plan   # NS OU      Patient is having difficulty with daily activities due to visual impairment. Patient feels that they are no longer managing with current correction and would like to move forward with cataract surgery. Explained benefits alternatives and risks including but not limited to loss of vision, severe infection, retinal detachment, need for more surgery, visual disturbances etc...  Informed patient that reaching uncorrected vision aim (without glasses) after cataract surgery is not certain. Made patient aware they may need glasses, laser vision correction or in worst case scenario, IOL exchange.      Dilates well  no PXF  no Flomax  no guttae    -Aim: distance OU  - dominant eye  -Previous refractive surgery status: no  -Corneal astigmatism <1      # glaucoma suspect  Had suspicious OCT RNFL with [facility]   IOP is normal  Will repeat measurements after sx      Follow-up after sx      Attending Physician Attestation:  Complete  documentation of historical and exam elements from today's encounter can be found in the full encounter summary report (not reduplicated in this progress note).  I personally obtained the chief complaint(s) and history of present illness.  I confirmed and edited as necessary the review of systems, past medical/surgical history, family history, social history, and examination findings as documented by others; and I examined the patient myself.  I personally reviewed the relevant tests, images, and reports as documented above.  I formulated and edited as necessary the assessment and plan and discussed the findings and management plan with the patient and family. - Mitchell Crum MD

## 2023-04-04 ENCOUNTER — TELEPHONE (OUTPATIENT)
Dept: OPHTHALMOLOGY | Facility: CLINIC | Age: 78
End: 2023-04-04
Payer: COMMERCIAL

## 2023-04-05 ENCOUNTER — TELEPHONE (OUTPATIENT)
Dept: OPHTHALMOLOGY | Facility: CLINIC | Age: 78
End: 2023-04-05
Payer: COMMERCIAL

## 2023-04-05 PROBLEM — H25.13 NUCLEAR SENILE CATARACT OF BOTH EYES: Status: ACTIVE | Noted: 2023-04-05

## 2023-04-05 NOTE — TELEPHONE ENCOUNTER
Spoke with patient to offer dates in May because some time opened up, patient could not do these dates because she will be moving. Originally offered dates in July and she did not want these dates either. Will keep patient in mind should earlier dates open for April     Anna C. Schoenecker on 4/5/2023 at 10:37 AM

## 2023-04-28 NOTE — ADDENDUM NOTE
Encounter addended by: Liana Armijo, OT on: 4/28/2023 8:04 AM   Actions taken: Clinical Note Signed, Flowsheet accepted, Episode resolved

## 2023-04-28 NOTE — PROGRESS NOTES
04/28/23 0800   Signing Clinician's Name / Credentials   Signing clinician's name / credentials CAMRYN Gallardo/GABRIELLA, GUILLERMINA-DEMI   Session Number   Session Number DISCHARGE NOTE   Comments   Comments Patient completed 22 treatment sessions of CDT for chronic BLE lymphedema, did not return for follow-up, unable to determine final progress toward goals.  Discharge from edema treatment.

## 2023-08-27 ENCOUNTER — HEALTH MAINTENANCE LETTER (OUTPATIENT)
Age: 78
End: 2023-08-27

## 2024-06-03 ENCOUNTER — TELEPHONE (OUTPATIENT)
Dept: WOUND CARE | Facility: CLINIC | Age: 79
End: 2024-06-03
Payer: COMMERCIAL

## 2024-06-03 NOTE — TELEPHONE ENCOUNTER
Self referral.    Routing to scheduling to coordinate the following:  NEW VASCULAR PATIENT consult with Vascular Medicine  Please schedule this at next available    Appt note:  Self referred for lymphedema; clinical notes in Care Everywhere    JOSY SheehanN, RN, [facility] [place]

## 2024-06-03 NOTE — TELEPHONE ENCOUNTER
New patient requesting an appt for lymphedema and cellulitis with her legs. She is concerned about urgency as another provider would like to wrap her legs but is waiting due to the cellulitis.    [phone]

## 2024-06-03 NOTE — TELEPHONE ENCOUNTER
Patient returned call to clinic. She reports she does not have open and draining wounds at this time and is looking for lymphedema guidance. The lymphedema therapist will not resume her compression until she is cleared to do so and she has hesitations with her PCP regarding her opinion. Forwarding to vascular medicine for that office to triage.

## 2024-06-04 NOTE — TELEPHONE ENCOUNTER
"Patient was offered 06/13/24 for her Vascular Medicine Consult and patient declined, stated 'I don't think you can help me\" and hung up. Pt stated that she needed to handle one thing at a time and referred to her cellulitis. Pt needs the ok to get her legs wrapped and is in a lot of pain. Pt is concerned that she's not going to be seen by the right doctor. Pt stated that her wound seemed like they were drying up. Pt was advised that if we didn't schedule her for 06/13/24 that we were looking at the end of June/beginning of July for our next available Consult Appointment.   "

## 2024-08-02 ENCOUNTER — TRANSCRIBE ORDERS (OUTPATIENT)
Dept: OTHER | Age: 79
End: 2024-08-02

## 2024-08-02 DIAGNOSIS — I89.0 LYMPHEDEMA: Primary | ICD-10-CM

## 2024-08-09 DIAGNOSIS — G47.33 OSA (OBSTRUCTIVE SLEEP APNEA): Primary | ICD-10-CM

## 2024-10-02 ENCOUNTER — THERAPY VISIT (OUTPATIENT)
Dept: OCCUPATIONAL THERAPY | Facility: CLINIC | Age: 79
End: 2024-10-02
Payer: COMMERCIAL

## 2024-10-02 DIAGNOSIS — I89.0 LYMPHEDEMA OF BOTH LOWER EXTREMITIES: Primary | ICD-10-CM

## 2024-10-02 DIAGNOSIS — I89.0 LYMPHEDEMA: ICD-10-CM

## 2024-10-02 PROCEDURE — 97166 OT EVAL MOD COMPLEX 45 MIN: CPT | Mod: GO

## 2024-10-02 PROCEDURE — 97535 SELF CARE MNGMENT TRAINING: CPT | Mod: GO

## 2024-10-03 NOTE — PROGRESS NOTES
OCCUPATIONAL THERAPY EVALUATION  Type of Visit: Evaluation     Fall Risk Screen:  Fall screen completed by: OT  Have you fallen 2 or more times in the past year?: No  Have you fallen and had an injury in the past year?: No  Is patient a fall risk?: Yes; Department fall risk interventions implemented  Fall screen comments: patient declines PT at this time    Subjective      Presenting condition or subjective complaint: edema in legs  Date of onset: 08/01/24    Relevant medical history:     Past Medical History:   Diagnosis Date    Hypertension     Uncomplicated asthma     Morbid obesity, sepsis, NEGRITA, a-fib  Dates & types of surgery: No past surgical history on file.        Prior therapy history for the same diagnosis, illness or injury: Yes CDT; multiple episodes at this clinic, most recently at [facility]    Prior Level of Function  Transfers: Assistive equipment  Ambulation: Assistive equipment  ADL:  mod I  IADL: Driving, Finances, Meal preparation, Medication management, work    Living Environment  Social support:   lives with roommate   Type of home: apt    Stairs to enter the home:       no  Ramp: no    Stairs inside the home:    no     Help at home:  none  Equipment owned: 4ww, SPC  Employment:  works ~ 20 hrs. per week from home    Patient goals for therapy: reduce swelling     Pain assessment:  patient states she has 0/10 pain at rest, up to 7-8/10 with activity, mostly RLE /foot     Objective       EDEMA EVALUATION  Additional history:  Body part affected by edema:  BLE, trunk  If not cancer related, problems with veins or cause of swelling:  CVI, morbid obesity, limited mobility with dependent positioning; patient has recently acquired recliner  Distance able to walk: limited, uses 4ww, has SOB   Time able to stand: limited    Edema etiology: Chronic Venous Insufficiency due to morbid obesity, dependent positioning        Cognitive Status Examination  Orientation: Oriented to person, place and time   Level of  Consciousness: Alert  Follows Commands and Answers Questions: % of time  Patient very tangential, requires frequent redirection    EDEMA  Skin Condition: Dryness, Hemosiderin deposits, Intact, Lipodermatosclerosis, Pitting; patient with chronic BLE lymphedema, stage II/III.  RLE significantly larger than LLE, patient has been unable to find shoes that fit for past several years.      GIRTH MEASUREMENTS: Refer to separate girth measurement flowsheet for specific measurements.    VOLUME LE  Right LE Total Volume (mL): 51951.87  Left LE Total Volume (mL): 86498.85  LE Limb Comparison: RLE >LLE by 32%                RANGE OF MOTION:  limited LB mobility  STRENGTH: LE Strength WFL    ACTIVITIES OF DAILY LIVING: mod I; takes sponge baths, limited AE at home  BED MOBILITY: Min Assist  TRANSFERS: WFL with use of 4ww  GAIT/LOCOMOTION: slow gait        Assessment & Plan   CLINICAL IMPRESSIONS  Medical Diagnosis: lymphedema, 189.0, 457.1    Treatment Diagnosis: BLE lymphedema, stage 3    Impression/Assessment:  Patient with chronic BLE lymphedema, hx of cellulitis and sepsis; has HP garments which no longer fit RBK, patient has been unable to manage use of lymphatic pump at home, currently no wounds nor lymphorrhea.  Patient shows significant difference in volume BLE (32%); currently unable to find shoes that fit.  Patient would benefit from skilled therapy to decrease risk of infection, progressive soft tissue fibrosis, and for improved fit of clothing and gait/transfer safety.    Clinical Decision Making (Complexity):  Assessment of Occupational Performance: 3-5 Performance Deficits  Occupational Performance Limitations: bathing/showering, functional mobility, hygiene and grooming, and home establishment and management  Clinical Decision Making (Complexity): Moderate complexity    PLAN OF CARE  Treatment Interventions:  Interventions: Self-Care/Home Management, Therapeutic Exercise, Manual Therapy    Long Term Goals    OT Goal 1  Goal Identifier: volume  Goal Description: For decreased risk of infection, skin breakdown/wounds & progressive soft-tissue fibrosis and improved fit of footwear, volume will be reduced by 1000mL RBK, 400 mL LBK  Rationale: In order to maximize safety and independence with performance of self-care activities;In order to maximize safety and independence with ADL/IADLs;In order to maximize safety and independence with functional transfers and functional mobility within the home or community;In order to safely and appropriately apply compensatory strategies with ADL/IADL performance  Target Date: 12/02/24  OT Goal 2  Goal Identifier: GCB  Goal Description: To reduce volume of lymphedema and risk of soft tissue fibrosis, pt will tolerate up to 23hr/day wear gradient compression bandaging (GCB) of BBK.  Rationale: In order to maximize safety and independence with performance of self-care activities;In order to maximize safety and independence with ADL/IADLs;In order to maximize safety and independence with functional transfers and functional mobility within the home or community;In order to safely and appropriately apply compensatory strategies with ADL/IADL performance  Target Date: 12/02/24  OT Goal 3  Goal Identifier: home program  Goal Description: For long-term home mgmt chronic lymphedema pt/caregiver independent in home program a. GCB/GCB alternative garment for night wear b. compression garment for day wear c. daily use of lymphatic pump/HEP  Rationale: In order to maximize safety and independence with performance of self-care activities;In order to maximize safety and independence with ADL/IADLs;In order to maximize safety and independence with functional transfers and functional mobility within the home or community;In order to safely and appropriately apply compensatory strategies with ADL/IADL performance  Target Date: 12/02/24  OT Goal 4  Goal Identifier: lymphedema precautions  Goal  Description: Pt will independently verbalize the signs/symptoms of lymphedema, precautions and how to obtain a future lymphedema referral if edema persists to preserve skin integrity, prevent infection and preserve functional mobility.  Rationale: In order to maximize safety and independence with performance of self-care activities;In order to maximize safety and independence with ADL/IADLs;In order to maximize safety and independence with functional transfers and functional mobility within the home or community;In order to safely and appropriately apply compensatory strategies with ADL/IADL performance  Target Date: 12/02/24      Frequency of Treatment: 3x/week/4 weeks  Duration of Treatment: up to 12 weeks due to scheduling     Recommended Referrals to Other Professionals: Physical Therapy      Risks and benefits of evaluation/treatment have been explained.   Patient/Family/caregiver agrees with Plan of Care.     Evaluation Time:    OT Eval, Moderate Complexity Minutes (40683): 30      Signing Clinician: CANDY Trevizo [facility]                                                                                   OUTPATIENT OCCUPATIONAL THERAPY      PLAN OF TREATMENT FOR OUTPATIENT REHABILITATION   Patient's Last Name, First Name, Dora Christie YOB: 1945   Provider's Name   [facility]   Medical Record No.  [id number]     Onset Date: 08/01/24 Start of Care Date: 10/02/24     Medical Diagnosis:  lymphedema, 189.0, 457.1      OT Treatment Diagnosis:  BLE lymphedema, stage 3 Plan of Treatment  Frequency/Duration:3x/week/4 weeks/up to 12 weeks due to scheduling    Certification date from 10/02/24   To 12/30/24        See note for plan of treatment details and functional goals     Liana Armijo OT                         I CERTIFY THE NEED FOR THESE SERVICES FURNISHED UNDER        THIS PLAN OF TREATMENT AND WHILE UNDER  [facility] .             Physician Signature               Date    X_____________________________________________________                  Referring Provider:  Claudia Granda    Initial Assessment  See Epic Evaluation- 10/02/24

## 2024-10-20 ENCOUNTER — HEALTH MAINTENANCE LETTER (OUTPATIENT)
Age: 79
End: 2024-10-20

## 2024-10-22 ENCOUNTER — THERAPY VISIT (OUTPATIENT)
Dept: OCCUPATIONAL THERAPY | Facility: CLINIC | Age: 79
End: 2024-10-22
Payer: COMMERCIAL

## 2024-10-22 DIAGNOSIS — I89.0 LYMPHEDEMA OF BOTH LOWER EXTREMITIES: Primary | ICD-10-CM

## 2024-10-22 PROCEDURE — 97140 MANUAL THERAPY 1/> REGIONS: CPT | Mod: GO | Performed by: OCCUPATIONAL THERAPIST
